# Patient Record
Sex: FEMALE | Race: WHITE | HISPANIC OR LATINO
[De-identification: names, ages, dates, MRNs, and addresses within clinical notes are randomized per-mention and may not be internally consistent; named-entity substitution may affect disease eponyms.]

---

## 2018-10-26 ENCOUNTER — RECORD ABSTRACTING (OUTPATIENT)
Age: 18
End: 2018-10-26

## 2018-10-26 DIAGNOSIS — Z82.49 FAMILY HISTORY OF ISCHEMIC HEART DISEASE AND OTHER DISEASES OF THE CIRCULATORY SYSTEM: ICD-10-CM

## 2018-10-26 DIAGNOSIS — Z83.3 FAMILY HISTORY OF DIABETES MELLITUS: ICD-10-CM

## 2018-12-20 ENCOUNTER — APPOINTMENT (OUTPATIENT)
Dept: ENDOCRINOLOGY | Facility: CLINIC | Age: 18
End: 2018-12-20

## 2019-04-12 ENCOUNTER — RX CHANGE (OUTPATIENT)
Age: 19
End: 2019-04-12

## 2020-06-17 ENCOUNTER — APPOINTMENT (OUTPATIENT)
Dept: GASTROENTEROLOGY | Facility: CLINIC | Age: 20
End: 2020-06-17
Payer: COMMERCIAL

## 2020-06-17 VITALS
DIASTOLIC BLOOD PRESSURE: 82 MMHG | HEART RATE: 110 BPM | WEIGHT: 293 LBS | BODY MASS INDEX: 43.4 KG/M2 | SYSTOLIC BLOOD PRESSURE: 122 MMHG | HEIGHT: 69 IN

## 2020-06-17 DIAGNOSIS — Z01.818 ENCOUNTER FOR OTHER PREPROCEDURAL EXAMINATION: ICD-10-CM

## 2020-06-17 PROCEDURE — 99203 OFFICE O/P NEW LOW 30 MIN: CPT

## 2020-06-17 RX ORDER — DEXAMETHASONE 1.5 MG/1
1.5 TABLET ORAL
Refills: 0 | Status: DISCONTINUED | COMMUNITY
End: 2020-06-17

## 2020-07-02 NOTE — ASSESSMENT
[FreeTextEntry1] : Patient planning for gastric sleeve, needs pre op EGD to r/o h. pylori, Bernal's esophagus, tumor, esophagitis prior to surgery. \par \par Risks (including but not limited to sedation risks, infection, bleeding, perforation), benefits and alternatives to procedure, including not doing the procedure, were discussed with patient. Patient understood and agreed to proceed with EGD\par EGD preparation instructions reviewed with patient.\par \par Patient to hold Metformin on the day of the procedure. \par

## 2020-07-02 NOTE — HISTORY OF PRESENT ILLNESS
[FreeTextEntry1] : 19 year F s/p adenoidectomy, hypothyroidism, morbid obesity, planning for bariatric surgery/gastric sleeve later this summer is seen at the request of Dr. Huerta for evaluation for EGD  to r/o h. pylori, Bernal's esophagus, tumor, esophagitis prior to surgery. \par \par she is scheduled to start metformin\par \par Patient denies abdominal pain , n/v/heartburn, reflux, dysphagia/odynophagia, change in bowel habits, diarrhea, constipation, brbpr, melena. no weight loss.  Good appetite and energy level. Patient has daily BM, denies regular NSAID use. \par \par

## 2020-07-02 NOTE — PHYSICAL EXAM
[General Appearance - Alert] : alert [General Appearance - In No Acute Distress] : in no acute distress [Sclera] : the sclera and conjunctiva were normal [Outer Ear] : the ears and nose were normal in appearance [Neck Appearance] : the appearance of the neck was normal [Apical Impulse] : the apical impulse was normal [] : no respiratory distress [Abdomen Soft] : soft [Abdomen Tenderness] : non-tender [Abnormal Walk] : normal gait [Skin Color & Pigmentation] : normal skin color and pigmentation [Cranial Nerves] : cranial nerves 2-12 were intact [Oriented To Time, Place, And Person] : oriented to person, place, and time [FreeTextEntry1] : obese

## 2020-07-05 ENCOUNTER — RESULT REVIEW (OUTPATIENT)
Age: 20
End: 2020-07-05

## 2020-07-06 ENCOUNTER — RESULT REVIEW (OUTPATIENT)
Age: 20
End: 2020-07-06

## 2020-07-07 ENCOUNTER — RESULT REVIEW (OUTPATIENT)
Age: 20
End: 2020-07-07

## 2020-07-07 ENCOUNTER — APPOINTMENT (OUTPATIENT)
Dept: GASTROENTEROLOGY | Facility: HOSPITAL | Age: 20
End: 2020-07-07

## 2020-12-23 PROBLEM — Z01.818 ENCOUNTER FOR PRE-OPERATIVE EXAMINATION: Status: RESOLVED | Noted: 2020-07-02 | Resolved: 2020-12-23

## 2020-12-29 ENCOUNTER — APPOINTMENT (OUTPATIENT)
Dept: ENDOCRINOLOGY | Facility: CLINIC | Age: 20
End: 2020-12-29
Payer: COMMERCIAL

## 2020-12-29 VITALS
SYSTOLIC BLOOD PRESSURE: 120 MMHG | OXYGEN SATURATION: 98 % | HEART RATE: 105 BPM | DIASTOLIC BLOOD PRESSURE: 80 MMHG | HEIGHT: 69 IN | WEIGHT: 293 LBS | TEMPERATURE: 98.1 F | BODY MASS INDEX: 43.4 KG/M2

## 2020-12-29 PROCEDURE — 99072 ADDL SUPL MATRL&STAF TM PHE: CPT

## 2020-12-29 PROCEDURE — G0447 BEHAVIOR COUNSEL OBESITY 15M: CPT

## 2020-12-29 PROCEDURE — 99215 OFFICE O/P EST HI 40 MIN: CPT | Mod: 25

## 2020-12-29 RX ORDER — LIRAGLUTIDE 6 MG/ML
18 INJECTION, SOLUTION SUBCUTANEOUS
Refills: 0 | Status: DISCONTINUED | COMMUNITY
End: 2020-12-29

## 2020-12-29 NOTE — HISTORY OF PRESENT ILLNESS
[FreeTextEntry1] : Ms. MERLIN EDWARDS is a 20 year old female\par coming for followup morbid obesity mild sleep apnea thyroid nodules hypothyroidism last seen in early 2018\par Gastric sleeve 9/1/2020 Dr Fraga [facility] \par lost 93lb \par feels grate \par losing her hair\par \par dose of Synthroid increased from 50 to 75mcg qd in July 2020 Dr Cota [facility]\par also was advised to start Metformin was afraid of \par \par had repeated sleep study received CPAP never used, 8/2020 before her weight loss \par \par  \par        labs reviewed with the pt done by the bariatric team thyroid was not checked\par     folows  with a nutritionist every 3 months\par   \par        reports facial hirsutism, mustache mild, lower belly,chest, lower back \par        mild strech marks\par        324 last physical 2/2016 , then 338lb last year, 360lb a year later \par  \par  \par  \par        started Synthroid 25mcg qd started 1/28/17\par        labs reviewed mildy elevated prolactin , repeated normal \par        thyroid US showed small cysts 7mm\par        denies FHx thyroid ca\par        denies neck irradiation\par        denies dysphagia\par        denies dyspnea\par        denies dysphonia.

## 2020-12-29 NOTE — REVIEW OF SYSTEMS
[Recent Weight Loss (___ Lbs)] : recent weight loss: [unfilled] lbs [Acanthosis] : acanthosis [Cold Intolerance] : cold intolerance [Negative] : Heme/Lymph [Irregular Menses] : regular menses [FreeTextEntry8] : no more heavy periods

## 2020-12-29 NOTE — REASON FOR VISIT
[Follow - Up] : a follow-up visit [Other___] : [unfilled] [Hypothyroidism] : hypothyroidism [Thyroid nodule/ MNG] : thyroid nodule/ MNG [Weight Management/Obesity] : weight management/obesity

## 2021-01-04 ENCOUNTER — RESULT REVIEW (OUTPATIENT)
Age: 21
End: 2021-01-04

## 2021-01-15 LAB
ACTH SER-ACNC: 10.4 PG/ML
ALBUMIN SERPL ELPH-MCNC: 4.3 G/DL
ALP BLD-CCNC: 99 U/L
ALPHA SUBUNIT SERPL-MCNC: <0.15 NG/ML
ALT SERPL-CCNC: 18 U/L
ANION GAP SERPL CALC-SCNC: 19 MMOL/L
AST SERPL-CCNC: 13 U/L
BASOPHILS # BLD AUTO: 0.02 K/UL
BASOPHILS NFR BLD AUTO: 0.3 %
BILIRUB SERPL-MCNC: 0.7 MG/DL
BUN SERPL-MCNC: 10 MG/DL
CALCIUM SERPL-MCNC: 9.7 MG/DL
CHLORIDE SERPL-SCNC: 106 MMOL/L
CHOLEST SERPL-MCNC: 178 MG/DL
CO2 SERPL-SCNC: 17 MMOL/L
CORTIS SERPL-MCNC: 12.2 UG/DL
CREAT SERPL-MCNC: 0.93 MG/DL
EOSINOPHIL # BLD AUTO: 0.03 K/UL
EOSINOPHIL NFR BLD AUTO: 0.5 %
ESTIMATED AVERAGE GLUCOSE: 97 MG/DL
ESTRADIOL SERPL-MCNC: 86 PG/ML
FSH SERPL-MCNC: 2.3 IU/L
GH SERPL-MCNC: 3.37 NG/ML
GLUCOSE SERPL-MCNC: 72 MG/DL
HBA1C MFR BLD HPLC: 5 %
HCT VFR BLD CALC: 44.6 %
HDLC SERPL-MCNC: 34 MG/DL
HGB BLD-MCNC: 14.2 G/DL
IGF-1 INTERP: NORMAL
IGF-I BLD-MCNC: 242 NG/ML
IMM GRANULOCYTES NFR BLD AUTO: 0.2 %
LDLC SERPL CALC-MCNC: 118 MG/DL
LH SERPL-ACNC: 3.7 IU/L
LYMPHOCYTES # BLD AUTO: 1.14 K/UL
LYMPHOCYTES NFR BLD AUTO: 18.8 %
MAN DIFF?: NORMAL
MCHC RBC-ENTMCNC: 28.7 PG
MCHC RBC-ENTMCNC: 31.8 GM/DL
MCV RBC AUTO: 90.1 FL
MONOCYTES # BLD AUTO: 0.64 K/UL
MONOCYTES NFR BLD AUTO: 10.6 %
NEUTROPHILS # BLD AUTO: 4.21 K/UL
NEUTROPHILS NFR BLD AUTO: 69.6 %
NONHDLC SERPL-MCNC: 144 MG/DL
OSMOLALITY SERPL: 294 MOSMOL/KG
OSMOLALITY UR: 719 MOSM/KG
PLATELET # BLD AUTO: 240 K/UL
POTASSIUM SERPL-SCNC: 4.1 MMOL/L
PROLACTIN SERPL-MCNC: 24.3 NG/ML
PROT SERPL-MCNC: 6.8 G/DL
RBC # BLD: 4.95 M/UL
RBC # FLD: 13.4 %
SODIUM SERPL-SCNC: 142 MMOL/L
T4 FREE SERPL-MCNC: 1.3 NG/DL
TRIGL SERPL-MCNC: 126 MG/DL
TSH SERPL-ACNC: 2.28 UIU/ML
WBC # FLD AUTO: 6.05 K/UL

## 2021-04-01 ENCOUNTER — APPOINTMENT (OUTPATIENT)
Dept: ENDOCRINOLOGY | Facility: CLINIC | Age: 21
End: 2021-04-01
Payer: COMMERCIAL

## 2021-04-01 VITALS
HEIGHT: 69 IN | DIASTOLIC BLOOD PRESSURE: 64 MMHG | OXYGEN SATURATION: 98 % | SYSTOLIC BLOOD PRESSURE: 112 MMHG | BODY MASS INDEX: 39.4 KG/M2 | WEIGHT: 266 LBS | HEART RATE: 57 BPM

## 2021-04-01 LAB
ALBUMIN SERPL ELPH-MCNC: 4.3 G/DL
ALP BLD-CCNC: 95 U/L
ALT SERPL-CCNC: 14 U/L
ANION GAP SERPL CALC-SCNC: 12 MMOL/L
AST SERPL-CCNC: 11 U/L
BILIRUB SERPL-MCNC: 0.6 MG/DL
BUN SERPL-MCNC: 11 MG/DL
CALCIUM SERPL-MCNC: 9.7 MG/DL
CHLORIDE SERPL-SCNC: 106 MMOL/L
CHOLEST SERPL-MCNC: 168 MG/DL
CO2 SERPL-SCNC: 24 MMOL/L
CREAT SERPL-MCNC: 0.79 MG/DL
ESTIMATED AVERAGE GLUCOSE: 103 MG/DL
GLUCOSE SERPL-MCNC: 95 MG/DL
HBA1C MFR BLD HPLC: 5.2 %
HDLC SERPL-MCNC: 36 MG/DL
LDLC SERPL CALC-MCNC: 113 MG/DL
MONOMERIC PROLACTIN (ICMA)*: 11.5 NG/ML
NONHDLC SERPL-MCNC: 132 MG/DL
PERCENT MACROPROLACTIN: 27 %
POTASSIUM SERPL-SCNC: 4.1 MMOL/L
PROLACTIN SERPL-MCNC: 16.6 NG/ML
PROLACTIN SERPL-MCNC: 16.7 NG/ML
PROLACTIN SERPL-MCNC: 16.7 NG/ML
PROLACTIN, SERUM (ICMA)*: 15.7 NG/ML
PROT SERPL-MCNC: 6.6 G/DL
SODIUM SERPL-SCNC: 142 MMOL/L
T4 FREE SERPL-MCNC: 1.2 NG/DL
TRIGL SERPL-MCNC: 97 MG/DL
TSH SERPL-ACNC: 1.54 UIU/ML

## 2021-04-01 PROCEDURE — 99072 ADDL SUPL MATRL&STAF TM PHE: CPT

## 2021-04-01 PROCEDURE — G0447 BEHAVIOR COUNSEL OBESITY 15M: CPT

## 2021-04-01 PROCEDURE — 99214 OFFICE O/P EST MOD 30 MIN: CPT | Mod: 25

## 2021-04-01 RX ORDER — URSODIOL 300 MG/1
300 CAPSULE ORAL
Qty: 60 | Refills: 0 | Status: DISCONTINUED | COMMUNITY
Start: 2020-08-31 | End: 2021-04-01

## 2021-04-01 RX ORDER — FAMOTIDINE 20 MG/1
20 TABLET, FILM COATED ORAL
Qty: 180 | Refills: 0 | Status: DISCONTINUED | COMMUNITY
Start: 2020-08-31 | End: 2021-04-01

## 2021-04-01 RX ORDER — ENOXAPARIN SODIUM 100 MG/ML
60 INJECTION SUBCUTANEOUS
Qty: 17 | Refills: 0 | Status: DISCONTINUED | COMMUNITY
Start: 2020-09-02 | End: 2021-04-01

## 2021-04-01 RX ORDER — CELECOXIB 200 MG/1
200 CAPSULE ORAL
Qty: 6 | Refills: 0 | Status: DISCONTINUED | COMMUNITY
Start: 2020-08-31 | End: 2021-04-01

## 2021-04-01 RX ORDER — ONDANSETRON 4 MG/1
4 TABLET, ORALLY DISINTEGRATING ORAL
Qty: 21 | Refills: 0 | Status: DISCONTINUED | COMMUNITY
Start: 2020-09-15 | End: 2021-04-01

## 2021-04-01 NOTE — HISTORY OF PRESENT ILLNESS
[FreeTextEntry1] : Ms. MERLIN EDWARDS is a 20 year old female\par coming for followup morbid obesity mild sleep apnea thyroid nodules hypothyroidism last seen in early 2018\par Gastric sleeve 9/1/2020 Dr Fraga [facility] \par lost 130lb since then feels good no active complains \par worried about taking calciumwas it was elevated in the past \par \par \par dose of Synthroid increased from 50 to 75mcg qd in July 2020 Dr Cota [facility]\par also was advised to start Metformin was afraid of \par \par had repeated sleep study received CPAP never used, 8/2020 before her weight loss \par \par  \par        labs reviewed with the pt done by the bariatric team thyroid was not checked\par     folows  with a nutritionist every 3 months\par   \par        reports facial hirsutism, mustache mild, lower belly,chest, lower back \par        mild stretch marks\par        324 last physical 2/2016 , then 338lb last year, 360lb a year later \par  \par  \par  \par        started Synthroid 25mcg qd started 1/28/17\par        labs reviewed mildy elevated prolactin , repeated normal \par        thyroid US showed small cysts 7mm\par        denies FHx thyroid ca\par        denies neck irradiation\par        denies dysphagia\par        denies dyspnea\par        denies dysphonia.

## 2021-04-08 ENCOUNTER — APPOINTMENT (OUTPATIENT)
Dept: INTERNAL MEDICINE | Facility: CLINIC | Age: 21
End: 2021-04-08
Payer: COMMERCIAL

## 2021-04-08 VITALS
HEART RATE: 74 BPM | WEIGHT: 259 LBS | TEMPERATURE: 97.4 F | DIASTOLIC BLOOD PRESSURE: 80 MMHG | SYSTOLIC BLOOD PRESSURE: 122 MMHG | BODY MASS INDEX: 38.36 KG/M2 | OXYGEN SATURATION: 97 % | HEIGHT: 69 IN

## 2021-04-08 DIAGNOSIS — Z87.898 PERSONAL HISTORY OF OTHER SPECIFIED CONDITIONS: ICD-10-CM

## 2021-04-08 DIAGNOSIS — Z78.9 OTHER SPECIFIED HEALTH STATUS: ICD-10-CM

## 2021-04-08 DIAGNOSIS — Z86.39 PERSONAL HISTORY OF OTHER ENDOCRINE, NUTRITIONAL AND METABOLIC DISEASE: ICD-10-CM

## 2021-04-08 PROCEDURE — 99072 ADDL SUPL MATRL&STAF TM PHE: CPT

## 2021-04-08 PROCEDURE — 99203 OFFICE O/P NEW LOW 30 MIN: CPT | Mod: 25

## 2021-04-08 PROCEDURE — 36415 COLL VENOUS BLD VENIPUNCTURE: CPT

## 2021-04-08 NOTE — HISTORY OF PRESENT ILLNESS
[FreeTextEntry8] : here  to establish care, former px in pediatric office  , needs form completion for school\par had bariatric sx last Sept , recovered well , preop weight 400 Lb

## 2021-04-08 NOTE — REVIEW OF SYSTEMS
[Negative] : Neurological [Recent Change In Weight] : ~T recent weight change [FreeTextEntry2] : lost a lot

## 2021-04-12 LAB
M TB IFN-G BLD-IMP: NEGATIVE
QUANTIFERON TB PLUS MITOGEN MINUS NIL: 7.45 IU/ML
QUANTIFERON TB PLUS NIL: 0.01 IU/ML
QUANTIFERON TB PLUS TB1 MINUS NIL: 0 IU/ML
QUANTIFERON TB PLUS TB2 MINUS NIL: 0 IU/ML

## 2021-04-27 ENCOUNTER — LABORATORY RESULT (OUTPATIENT)
Age: 21
End: 2021-04-27

## 2021-04-27 ENCOUNTER — APPOINTMENT (OUTPATIENT)
Dept: INTERNAL MEDICINE | Facility: CLINIC | Age: 21
End: 2021-04-27
Payer: COMMERCIAL

## 2021-04-27 VITALS
BODY MASS INDEX: 37.77 KG/M2 | OXYGEN SATURATION: 98 % | DIASTOLIC BLOOD PRESSURE: 70 MMHG | TEMPERATURE: 97 F | HEART RATE: 58 BPM | SYSTOLIC BLOOD PRESSURE: 136 MMHG | WEIGHT: 255 LBS | HEIGHT: 69 IN

## 2021-04-27 DIAGNOSIS — Z11.1 ENCOUNTER FOR SCREENING FOR RESPIRATORY TUBERCULOSIS: ICD-10-CM

## 2021-04-27 DIAGNOSIS — Z71.89 OTHER SPECIFIED COUNSELING: ICD-10-CM

## 2021-04-27 DIAGNOSIS — Z02.89 ENCOUNTER FOR OTHER ADMINISTRATIVE EXAMINATIONS: ICD-10-CM

## 2021-04-27 DIAGNOSIS — Z76.89 PERSONS ENCOUNTERING HEALTH SERVICES IN OTHER SPECIFIED CIRCUMSTANCES: ICD-10-CM

## 2021-04-27 DIAGNOSIS — Z00.01 ENCOUNTER FOR GENERAL ADULT MEDICAL EXAMINATION WITH ABNORMAL FINDINGS: ICD-10-CM

## 2021-04-27 DIAGNOSIS — Z00.00 ENCOUNTER FOR GENERAL ADULT MEDICAL EXAMINATION W/OUT ABNORMAL FINDINGS: ICD-10-CM

## 2021-04-27 DIAGNOSIS — Z23 ENCOUNTER FOR IMMUNIZATION: ICD-10-CM

## 2021-04-27 PROCEDURE — 90715 TDAP VACCINE 7 YRS/> IM: CPT

## 2021-04-27 PROCEDURE — 99395 PREV VISIT EST AGE 18-39: CPT | Mod: 25

## 2021-04-27 PROCEDURE — 90471 IMMUNIZATION ADMIN: CPT

## 2021-04-27 PROCEDURE — 99072 ADDL SUPL MATRL&STAF TM PHE: CPT

## 2021-04-27 PROCEDURE — 36415 COLL VENOUS BLD VENIPUNCTURE: CPT

## 2021-04-27 NOTE — PHYSICAL EXAM
[Obese] : patient was observed to be obese [Normal] : normal gait, coordination grossly intact, no focal deficits [de-identified] : ? L nodule on thyroid [de-identified] : bilat cystic no  masses [de-identified] : no rashes, excess skin

## 2021-04-27 NOTE — REVIEW OF SYSTEMS
[Recent Change In Weight] : ~T recent weight change [Negative] : Neurological [FreeTextEntry2] : lost  146 lb after surgery

## 2021-04-28 LAB
25(OH)D3 SERPL-MCNC: 39.6 NG/ML
ALBUMIN SERPL ELPH-MCNC: 4.2 G/DL
ALP BLD-CCNC: 103 U/L
ALT SERPL-CCNC: 14 U/L
ANION GAP SERPL CALC-SCNC: 15 MMOL/L
APPEARANCE: CLEAR
AST SERPL-CCNC: 16 U/L
BASOPHILS # BLD AUTO: 0.02 K/UL
BASOPHILS NFR BLD AUTO: 0.4 %
BILIRUB SERPL-MCNC: 0.4 MG/DL
BILIRUBIN URINE: NEGATIVE
BLOOD URINE: NEGATIVE
BUN SERPL-MCNC: 11 MG/DL
CALCIUM SERPL-MCNC: 9.7 MG/DL
CHLORIDE SERPL-SCNC: 105 MMOL/L
CHOLEST SERPL-MCNC: 181 MG/DL
CO2 SERPL-SCNC: 22 MMOL/L
COLOR: YELLOW
CREAT SERPL-MCNC: 0.7 MG/DL
EOSINOPHIL # BLD AUTO: 0.03 K/UL
EOSINOPHIL NFR BLD AUTO: 0.6 %
GLUCOSE QUALITATIVE U: NEGATIVE
GLUCOSE SERPL-MCNC: 89 MG/DL
HBV SURFACE AB SER QL: NONREACTIVE
HBV SURFACE AG SER QL: NONREACTIVE
HCT VFR BLD CALC: 41.4 %
HCV AB SER QL: NONREACTIVE
HCV S/CO RATIO: 0.16 S/CO
HDLC SERPL-MCNC: 44 MG/DL
HGB BLD-MCNC: 12.9 G/DL
HIV1+2 AB SPEC QL IA.RAPID: NONREACTIVE
IMM GRANULOCYTES NFR BLD AUTO: 0.2 %
KETONES URINE: NEGATIVE
LDLC SERPL CALC-MCNC: 119 MG/DL
LEUKOCYTE ESTERASE URINE: NEGATIVE
LYMPHOCYTES # BLD AUTO: 1.31 K/UL
LYMPHOCYTES NFR BLD AUTO: 24.1 %
MAN DIFF?: NORMAL
MCHC RBC-ENTMCNC: 27.6 PG
MCHC RBC-ENTMCNC: 31.2 GM/DL
MCV RBC AUTO: 88.5 FL
MEV IGG FLD QL IA: 61.7 AU/ML
MEV IGG+IGM SER-IMP: POSITIVE
MONOCYTES # BLD AUTO: 0.38 K/UL
MONOCYTES NFR BLD AUTO: 7 %
MUV AB SER-ACNC: POSITIVE
MUV IGG SER QL IA: 72.2 AU/ML
NEUTROPHILS # BLD AUTO: 3.68 K/UL
NEUTROPHILS NFR BLD AUTO: 67.7 %
NITRITE URINE: NEGATIVE
NONHDLC SERPL-MCNC: 137 MG/DL
PH URINE: 6
PLATELET # BLD AUTO: 226 K/UL
POTASSIUM SERPL-SCNC: 4.2 MMOL/L
PROT SERPL-MCNC: 6.8 G/DL
PROTEIN URINE: NORMAL
RBC # BLD: 4.68 M/UL
RBC # FLD: 13.2 %
RUBV IGG FLD-ACNC: 3 INDEX
RUBV IGG SER-IMP: POSITIVE
SODIUM SERPL-SCNC: 141 MMOL/L
SPECIFIC GRAVITY URINE: 1.03
T4 FREE SERPL-MCNC: 1.5 NG/DL
TRIGL SERPL-MCNC: 92 MG/DL
TSH SERPL-ACNC: 1.3 UIU/ML
UROBILINOGEN URINE: ABNORMAL
VZV AB TITR SER: POSITIVE
VZV IGG SER IF-ACNC: 1840 INDEX
WBC # FLD AUTO: 5.43 K/UL

## 2021-05-01 NOTE — HEALTH RISK ASSESSMENT
[Good] : ~his/her~  mood as  good [No] : No [HIV Test offered] : HIV Test offered [College] : College [Single] : single [Patient/Caregiver not ready to engage] : Patient/Caregiver not ready to engage [] : No DC instructions

## 2021-05-06 ENCOUNTER — APPOINTMENT (OUTPATIENT)
Dept: INTERNAL MEDICINE | Facility: CLINIC | Age: 21
End: 2021-05-06
Payer: COMMERCIAL

## 2021-05-06 VITALS — TEMPERATURE: 97.6 F

## 2021-05-06 DIAGNOSIS — Z23 ENCOUNTER FOR IMMUNIZATION: ICD-10-CM

## 2021-05-06 PROCEDURE — 90746 HEPB VACCINE 3 DOSE ADULT IM: CPT

## 2021-05-06 PROCEDURE — 90471 IMMUNIZATION ADMIN: CPT

## 2021-05-06 PROCEDURE — 99072 ADDL SUPL MATRL&STAF TM PHE: CPT

## 2021-06-02 ENCOUNTER — APPOINTMENT (OUTPATIENT)
Dept: INTERNAL MEDICINE | Facility: CLINIC | Age: 21
End: 2021-06-02
Payer: COMMERCIAL

## 2021-06-02 DIAGNOSIS — Z01.84 ENCOUNTER FOR ANTIBODY RESPONSE EXAMINATION: ICD-10-CM

## 2021-06-02 PROCEDURE — 99072 ADDL SUPL MATRL&STAF TM PHE: CPT

## 2021-06-02 PROCEDURE — 36415 COLL VENOUS BLD VENIPUNCTURE: CPT

## 2021-06-03 LAB — HBV SURFACE AB SERPL IA-ACNC: 17.2 MIU/ML

## 2021-06-17 ENCOUNTER — APPOINTMENT (OUTPATIENT)
Dept: INTERNAL MEDICINE | Facility: CLINIC | Age: 21
End: 2021-06-17

## 2021-08-10 ENCOUNTER — APPOINTMENT (OUTPATIENT)
Dept: ENDOCRINOLOGY | Facility: CLINIC | Age: 21
End: 2021-08-10
Payer: COMMERCIAL

## 2021-08-10 VITALS
OXYGEN SATURATION: 98 % | WEIGHT: 246 LBS | SYSTOLIC BLOOD PRESSURE: 132 MMHG | BODY MASS INDEX: 36.43 KG/M2 | HEART RATE: 47 BPM | RESPIRATION RATE: 18 BRPM | DIASTOLIC BLOOD PRESSURE: 74 MMHG | TEMPERATURE: 96.3 F | HEIGHT: 69 IN

## 2021-08-10 DIAGNOSIS — E78.1 PURE HYPERGLYCERIDEMIA: ICD-10-CM

## 2021-08-10 LAB
17OHP SERPL-MCNC: 11 NG/DL
ALBUMIN SERPL ELPH-MCNC: 4.2 G/DL
ALP BLD-CCNC: 71 U/L
ALT SERPL-CCNC: 11 U/L
ANDROST SERPL-MCNC: 64 NG/DL
ANION GAP SERPL CALC-SCNC: 11 MMOL/L
AST SERPL-CCNC: 13 U/L
BILIRUB SERPL-MCNC: 0.5 MG/DL
BUN SERPL-MCNC: 13 MG/DL
CALCIUM SERPL-MCNC: 9.8 MG/DL
CHLORIDE SERPL-SCNC: 106 MMOL/L
CHOLEST SERPL-MCNC: 176 MG/DL
CO2 SERPL-SCNC: 23 MMOL/L
CREAT SERPL-MCNC: 0.96 MG/DL
DHEA-S SERPL-MCNC: 193 UG/DL
ESTIMATED AVERAGE GLUCOSE: 103 MG/DL
GLUCOSE SERPL-MCNC: 89 MG/DL
HBA1C MFR BLD HPLC: 5.2 %
HDLC SERPL-MCNC: 47 MG/DL
LDLC SERPL CALC-MCNC: 113 MG/DL
NONHDLC SERPL-MCNC: 129 MG/DL
POTASSIUM SERPL-SCNC: 4.2 MMOL/L
PROLACTIN SERPL-MCNC: 19.6 NG/ML
PROT SERPL-MCNC: 6.7 G/DL
SODIUM SERPL-SCNC: 140 MMOL/L
T4 FREE SERPL-MCNC: 1.6 NG/DL
TESTOST SERPL-MCNC: 21.5 NG/DL
THYROGLOB AB SERPL-ACNC: <20 IU/ML
THYROPEROXIDASE AB SERPL IA-ACNC: <10 IU/ML
TRIGL SERPL-MCNC: 81 MG/DL
TSH SERPL-ACNC: 1.64 UIU/ML

## 2021-08-10 PROCEDURE — 99215 OFFICE O/P EST HI 40 MIN: CPT | Mod: 25

## 2021-08-10 PROCEDURE — G0447 BEHAVIOR COUNSEL OBESITY 15M: CPT

## 2021-08-10 NOTE — HISTORY OF PRESENT ILLNESS
[FreeTextEntry1] : Ms. MERLIN EDWARDS is a 20 year old female\par coming for followup morbid obesity mild sleep apnea thyroid nodules hypothyroidism last seen in early 2018\par Gastric sleeve 9/1/2020 Dr Fraga [facility] \par lost 130lb since then feels good no active complains \par worried about taking calciumwas it was elevated in the past \par \par \par dose of Synthroid increased from 50 to 75mcg qd in July 2020 Dr Cota [facility]\par also was advised to start Metformin was afraid of \par \par had repeated sleep study received CPAP never used, 8/2020 before her weight loss \par \par  \par        labs reviewed with the pt done by the bariatric team thyroid was not checked\par     folows  with a nutritionist every 3 months\par   \par        reports facial hirsutism, mustache mild, lower belly,chest, lower back \par        mild stretch marks\par        324 last physical 2/2016 , then 338lb last year, 360lb a year later \par  \par  \par  \par        started Synthroid 25mcg qd started 1/28/17\par        labs reviewed mildy elevated prolactin , repeated normal \par        thyroid US showed small cysts 7mm\par        denies FHx thyroid ca\par        denies neck irradiation\par        denies dysphagia\par        denies dyspnea\par        denies dysphonia.

## 2021-12-18 ENCOUNTER — LABORATORY RESULT (OUTPATIENT)
Age: 21
End: 2021-12-18

## 2021-12-23 ENCOUNTER — RESULT REVIEW (OUTPATIENT)
Age: 21
End: 2021-12-23

## 2021-12-27 ENCOUNTER — APPOINTMENT (OUTPATIENT)
Dept: ENDOCRINOLOGY | Facility: CLINIC | Age: 21
End: 2021-12-27
Payer: COMMERCIAL

## 2021-12-27 VITALS
OXYGEN SATURATION: 99 % | HEIGHT: 69 IN | HEART RATE: 55 BPM | BODY MASS INDEX: 34.66 KG/M2 | WEIGHT: 234 LBS | DIASTOLIC BLOOD PRESSURE: 80 MMHG | SYSTOLIC BLOOD PRESSURE: 120 MMHG

## 2021-12-27 DIAGNOSIS — L83 ACANTHOSIS NIGRICANS: ICD-10-CM

## 2021-12-27 DIAGNOSIS — L65.9 NONSCARRING HAIR LOSS, UNSPECIFIED: ICD-10-CM

## 2021-12-27 PROCEDURE — G0447 BEHAVIOR COUNSEL OBESITY 15M: CPT

## 2021-12-27 PROCEDURE — 99215 OFFICE O/P EST HI 40 MIN: CPT

## 2021-12-27 NOTE — HISTORY OF PRESENT ILLNESS
[FreeTextEntry1] : Ms. MERLIN EDWARDS is a 21 year old female\par coming for followup morbid obesity mild sleep apnea thyroid nodules hypothyroidism last seen in early 2018\par Gastric sleeve 9/1/2020 Dr Fraga [facility] \par lost 130lb since then feels good no active complains \par worried about taking calciumwas it was elevated in the past \par \par \par dose of Synthroid increased from 50 to 75mcg qd in July 2020 Dr Cota [facility]\par also was advised to start Metformin was afraid of \par \par had repeated sleep study received CPAP never used, 8/2020 before her weight loss \par \par  \par        labs reviewed with the pt done by the bariatric team thyroid was not checked\par     folows  with a nutritionist every 3 months\par   \par        reports facial hirsutism, mustache mild, lower belly,chest, lower back \par        mild stretch marks\par        324 last physical 2/2016 , then 338lb last year, 360lb a year later \par  \par  \par  \par        started Synthroid 25mcg qd started 1/28/17\par        labs reviewed mildy elevated prolactin , repeated normal \par        thyroid US showed small cysts 7mm\par        denies FHx thyroid ca\par        denies neck irradiation\par        denies dysphagia\par        denies dyspnea\par        denies dysphonia.

## 2022-05-20 ENCOUNTER — APPOINTMENT (OUTPATIENT)
Dept: ENDOCRINOLOGY | Facility: CLINIC | Age: 22
End: 2022-05-20

## 2022-08-30 ENCOUNTER — APPOINTMENT (OUTPATIENT)
Dept: INTERNAL MEDICINE | Facility: CLINIC | Age: 22
End: 2022-08-30

## 2022-10-19 ENCOUNTER — APPOINTMENT (OUTPATIENT)
Dept: ENDOCRINOLOGY | Facility: CLINIC | Age: 22
End: 2022-10-19

## 2022-11-09 ENCOUNTER — APPOINTMENT (OUTPATIENT)
Dept: ENDOCRINOLOGY | Facility: CLINIC | Age: 22
End: 2022-11-09

## 2022-11-09 VITALS — BODY MASS INDEX: 38.51 KG/M2 | WEIGHT: 260 LBS | HEIGHT: 69 IN

## 2022-11-09 DIAGNOSIS — Z86.69 PERSONAL HISTORY OF OTHER DISEASES OF THE NERVOUS SYSTEM AND SENSE ORGANS: ICD-10-CM

## 2022-11-09 DIAGNOSIS — Z86.39 PERSONAL HISTORY OF OTHER ENDOCRINE, NUTRITIONAL AND METABOLIC DISEASE: ICD-10-CM

## 2022-11-09 DIAGNOSIS — R73.03 PREDIABETES.: ICD-10-CM

## 2022-11-09 LAB
ALBUMIN SERPL ELPH-MCNC: 4.1 G/DL
ALP BLD-CCNC: 59 U/L
ALT SERPL-CCNC: 11 U/L
ANION GAP SERPL CALC-SCNC: 12 MMOL/L
AST SERPL-CCNC: 13 U/L
BILIRUB SERPL-MCNC: 0.5 MG/DL
BUN SERPL-MCNC: 11 MG/DL
CALCIUM SERPL-MCNC: 9.1 MG/DL
CHLORIDE SERPL-SCNC: 105 MMOL/L
CHOLEST SERPL-MCNC: 186 MG/DL
CO2 SERPL-SCNC: 23 MMOL/L
CREAT SERPL-MCNC: 0.87 MG/DL
EGFR: 95 ML/MIN/1.73M2
ESTIMATED AVERAGE GLUCOSE: 103 MG/DL
FERRITIN SERPL-MCNC: 10 NG/ML
GLUCOSE SERPL-MCNC: 92 MG/DL
HBA1C MFR BLD HPLC: 5.2 %
HDLC SERPL-MCNC: 49 MG/DL
IRON SATN MFR SERPL: 30 %
IRON SERPL-MCNC: 111 UG/DL
LDLC SERPL CALC-MCNC: 110 MG/DL
NONHDLC SERPL-MCNC: 137 MG/DL
POTASSIUM SERPL-SCNC: 4.5 MMOL/L
PROT SERPL-MCNC: 6.9 G/DL
SODIUM SERPL-SCNC: 140 MMOL/L
T4 FREE SERPL-MCNC: 1.6 NG/DL
TIBC SERPL-MCNC: 368 UG/DL
TRIGL SERPL-MCNC: 135 MG/DL
TSH SERPL-ACNC: 1.09 UIU/ML
UIBC SERPL-MCNC: 256 UG/DL

## 2022-11-09 PROCEDURE — G0447 BEHAVIOR COUNSEL OBESITY 15M: CPT | Mod: 95

## 2022-11-09 PROCEDURE — 99215 OFFICE O/P EST HI 40 MIN: CPT | Mod: 25,95

## 2022-11-09 RX ORDER — CALCIUM CARBONATE/VITAMIN D3 500 MG-2.5
TABLET,CHEWABLE ORAL
Refills: 0 | Status: DISCONTINUED | COMMUNITY
End: 2022-11-09

## 2022-11-09 RX ORDER — UBIDECARENONE/VIT E ACET 100MG-5
CAPSULE ORAL
Refills: 0 | Status: DISCONTINUED | COMMUNITY
End: 2022-11-09

## 2022-11-09 RX ORDER — ACETAMINOPHEN/DIPHENHYDRAMINE 500MG-25MG
1000 TABLET ORAL
Refills: 0 | Status: DISCONTINUED | COMMUNITY
End: 2022-11-09

## 2022-11-09 RX ORDER — ALBUTEROL SULFATE 90 UG/1
108 (90 BASE) INHALANT RESPIRATORY (INHALATION)
Qty: 1 | Refills: 2 | Status: DISCONTINUED | COMMUNITY
Start: 2021-08-02 | End: 2022-11-09

## 2022-11-09 NOTE — HISTORY OF PRESENT ILLNESS
[Home] : at home, [unfilled] , at the time of the visit. [Medical Office: ([facility])___] : at the medical office located in  [Verbal consent obtained from patient] : the patient, [unfilled] [FreeTextEntry1] : Ms. MERLIN EDWARDS is a 22 year old female\par coming for followup morbid obesity mild sleep apnea thyroid nodules hypothyroidism last seen in early 2018\par Gastric sleeve 9/1/2020 Dr Fraga [facility] \par lost 130lb since then feels good no active complains \par worried about taking calcium was it was elevated in the past \par \par \par dose of Synthroid increased from 50 to 75mcg qd in July 2020 Dr Cota [facility]\par also was advised to start Metformin was afraid of \par \par had repeated sleep study received CPAP never used, 8/2020 before her weight loss \par \par  \par        labs reviewed with the pt done by the bariatric team thyroid was not checked\par     folows  with a nutritionist every 3 months\par   \par        reports facial hirsutism, mustache mild, lower belly,chest, lower back \par        mild stretch marks\par        324 last physical 2/2016 , then 338lb last year, 360lb a year later \par  \par  \par  \par        started Synthroid 25mcg qd started 1/28/17\par        labs reviewed mildy elevated prolactin , repeated normal \par        thyroid US showed small cysts 7mm\par        denies FHx thyroid ca\par        denies neck irradiation\par        denies dysphagia\par        denies dyspnea\par        denies dysphonia.

## 2022-12-13 ENCOUNTER — APPOINTMENT (OUTPATIENT)
Dept: HEMATOLOGY ONCOLOGY | Facility: CLINIC | Age: 22
End: 2022-12-13

## 2022-12-13 VITALS
TEMPERATURE: 97.9 F | HEART RATE: 68 BPM | RESPIRATION RATE: 16 BRPM | HEIGHT: 69 IN | DIASTOLIC BLOOD PRESSURE: 83 MMHG | WEIGHT: 271.8 LBS | SYSTOLIC BLOOD PRESSURE: 139 MMHG | OXYGEN SATURATION: 98 % | BODY MASS INDEX: 40.26 KG/M2

## 2022-12-13 PROCEDURE — 99205 OFFICE O/P NEW HI 60 MIN: CPT

## 2022-12-13 RX ORDER — IBUPROFEN 600 MG/1
600 TABLET, FILM COATED ORAL
Qty: 42 | Refills: 0 | Status: COMPLETED | COMMUNITY
Start: 2022-06-28

## 2022-12-13 RX ORDER — CLINDAMYCIN HYDROCHLORIDE 300 MG/1
300 CAPSULE ORAL
Qty: 21 | Refills: 0 | Status: COMPLETED | COMMUNITY
Start: 2022-06-28

## 2022-12-13 NOTE — HISTORY OF PRESENT ILLNESS
[de-identified] : Ms. Amber Magdaleno is 22 year old with PIETRO here for consultation, referred by Dr. Yeni Gonzalez\par \par Patient with PMhx of hypothyroidism, hypercholesterolemia who had heavy menses prior to gastric bypass in 9/2020.  Menses are now normal - 4-7 days monthly with no clots.. \par Takes b12 supplement some times. \par \par 10/22/22 H/H 13.7/42.2 MCV 88.5 Ferritin 10\par 12/18/2021 Ferritin 21\par \par FHx:\par Mother with anemia \par \par Social History\par Smoking - never\par Alcohol - none\par Illicit drugs - none\par Getting her MS in speech. \par \par She has chronic fatigue with dizziness and headaches. \par

## 2022-12-13 NOTE — CONSULT LETTER
[Dear  ___] : Dear  [unfilled], [Consult Letter:] : I had the pleasure of evaluating your patient, [unfilled]. [Please see my note below.] : Please see my note below. [Consult Closing:] : Thank you very much for allowing me to participate in the care of this patient.  If you have any questions, please do not hesitate to contact me. [Sincerely,] : Sincerely, [FreeTextEntry3] : Yann Willis MD, MPH\par  of Medicine [facility] [employer] at [facility]\par Attending Physician \par Hematology and Medical Oncology\par [facility]\par

## 2022-12-13 NOTE — ASSESSMENT
[FreeTextEntry1] : Iron deficiency\par Ferritin 10\par Gastric sleeve surgery 2020 at [facility]\par LMP - 2 weeks ago. Was heavy prior to the weight loss surgery, but has been milder since\par Usually last 5 days, heavy x 1-2 days\par Symptoms- Tired fatigued, PICA (salt)\par Discussed at length about iron deficiency- etiology, signs and symptoms, complications, management options\par DIscussed about oral vs IV Iron\par I have reviewed the risks, benefits and side effects of IV iron with the patient. All questions were answered to satisfaction. Patient agrees to pursue IV iron.\par Schedule IV injectafer 750 mg x 2 or IV feraheme 510 mg x 2 or IV venofer 400 mg  x 3 (in the order of preference)\par Repeat blood work including CBC, ferritin, iron studies in 5-6 weeks. \par Further IV iron PRN for ferritin < \par Information given in writing\par \par Normal hemoglobin despite iron def\par Follow the levels after iron repletion\par Work up if she developed erythrocytosis\par \par Family H/o cancer\par Father - colon cancer\par M GF- colon cancer\par P GF - colon ca\par Discussed about genetic testing. She wants to defer for now\par Encouraged to pursue colonoscopy at least 10 years prior to the youngest age at diagnosis in her family\par \par Patient had multiple questions which were answered to satisfaction\par \par Labs in 8 weeks. CBC, CMP, anemia panel\par OV few days later\par

## 2022-12-15 LAB
BASOPHILS # BLD AUTO: 0.01 K/UL
BASOPHILS NFR BLD AUTO: 0.2 %
EOSINOPHIL # BLD AUTO: 0.03 K/UL
EOSINOPHIL NFR BLD AUTO: 0.7 %
HCT VFR BLD CALC: 42.2 %
HGB BLD-MCNC: 13.7 G/DL
IMM GRANULOCYTES NFR BLD AUTO: 0.2 %
LYMPHOCYTES # BLD AUTO: 1.44 K/UL
LYMPHOCYTES NFR BLD AUTO: 34.7 %
MAN DIFF?: NORMAL
MCHC RBC-ENTMCNC: 28.7 PG
MCHC RBC-ENTMCNC: 32.5 GM/DL
MCV RBC AUTO: 88.5 FL
MONOCYTES # BLD AUTO: 0.3 K/UL
MONOCYTES NFR BLD AUTO: 7.2 %
NEUTROPHILS # BLD AUTO: 2.36 K/UL
NEUTROPHILS NFR BLD AUTO: 57 %
PLATELET # BLD AUTO: 236 K/UL
RBC # BLD: 4.77 M/UL
RBC # FLD: 12.5 %
WBC # FLD AUTO: 4.15 K/UL

## 2023-02-07 ENCOUNTER — APPOINTMENT (OUTPATIENT)
Dept: HEMATOLOGY ONCOLOGY | Facility: CLINIC | Age: 23
End: 2023-02-07

## 2023-02-07 ENCOUNTER — RESULT REVIEW (OUTPATIENT)
Age: 23
End: 2023-02-07

## 2023-02-07 VITALS
HEART RATE: 60 BPM | TEMPERATURE: 97.3 F | OXYGEN SATURATION: 100 % | HEIGHT: 69 IN | WEIGHT: 278 LBS | RESPIRATION RATE: 16 BRPM | DIASTOLIC BLOOD PRESSURE: 78 MMHG | BODY MASS INDEX: 41.18 KG/M2 | SYSTOLIC BLOOD PRESSURE: 136 MMHG

## 2023-02-15 ENCOUNTER — APPOINTMENT (OUTPATIENT)
Dept: HEMATOLOGY ONCOLOGY | Facility: CLINIC | Age: 23
End: 2023-02-15
Payer: COMMERCIAL

## 2023-02-15 VITALS
WEIGHT: 280.31 LBS | BODY MASS INDEX: 41.52 KG/M2 | HEIGHT: 69 IN | OXYGEN SATURATION: 97 % | SYSTOLIC BLOOD PRESSURE: 124 MMHG | TEMPERATURE: 97.1 F | DIASTOLIC BLOOD PRESSURE: 76 MMHG | RESPIRATION RATE: 17 BRPM | HEART RATE: 60 BPM

## 2023-02-15 PROCEDURE — 99214 OFFICE O/P EST MOD 30 MIN: CPT | Mod: 25

## 2023-02-15 NOTE — HISTORY OF PRESENT ILLNESS
[de-identified] : Ms. Amber Magdaleno is 22 year old with PIETRO here for consultation, referred by Dr. Yeni Gonzalez\par \par Patient with PMhx of hypothyroidism, hypercholesterolemia who had heavy menses prior to gastric bypass in 9/2020.  Menses are now normal - 4-7 days monthly with no clots.. \par Takes b12 supplement some times. \par \par 10/22/22 H/H 13.7/42.2 MCV 88.5 Ferritin 10\par 12/18/2021 Ferritin 21\par \par FHx:\par Mother with anemia \par \par Social History\par Smoking - never\par Alcohol - none\par Illicit drugs - none\par Getting her MS in speech. \par \par She has chronic fatigue with dizziness and headaches. \par  [de-identified] : Patient is seen today for follow up\par s/p IV Injectafer 750 mg x 2\par Feels much better. Less tired No PICA (salt)\par LMP 3 weeks ago. Lasted 5 days

## 2023-02-15 NOTE — ASSESSMENT
[FreeTextEntry1] : Iron deficiency\par Ferritin 10\par Gastric sleeve surgery 2020 at [facility]\par LMP - 2 weeks ago. Was heavy prior to the weight loss surgery, but has been milder since\par Usually last 5 days, heavy x 1-2 days\par Symptoms- Tired fatigued, PICA (salt)\par s/p IV Injectafer 750 mg x 2\par Feels much better. Less tired No PICA (salt)\par LMP 3 weeks ago. Lasted 5 days\par Blood work shows =improved Hgb and ferritin\par No indication for iV Iron\par \par Normal hemoglobin despite iron def\par Follow the levels after iron repletion\par Work up if she developed erythrocytosis\par \par Family H/o cancer\par Father - colon cancer\par M GF- colon cancer\par P GF - colon ca\par Discussed about genetic testing. She wants to defer for now\par Encouraged to pursue colonoscopy at least 10 years prior to the youngest age at diagnosis in her family\par \par Patient had multiple questions which were answered to satisfaction\par \par Labs in 6 months. CBC, CMP, ferritin \par OV few days later\par

## 2023-02-15 NOTE — CONSULT LETTER
[FreeTextEntry3] : Yann Willis MD, MPH\par  of Medicine [facility] [employer] at [facility]\par Attending Physician \par Hematology and Medical Oncology\par [facility]\par

## 2023-05-01 ENCOUNTER — APPOINTMENT (OUTPATIENT)
Dept: ENDOCRINOLOGY | Facility: CLINIC | Age: 23
End: 2023-05-01
Payer: COMMERCIAL

## 2023-05-01 VITALS
HEART RATE: 70 BPM | OXYGEN SATURATION: 98 % | WEIGHT: 287 LBS | DIASTOLIC BLOOD PRESSURE: 80 MMHG | SYSTOLIC BLOOD PRESSURE: 118 MMHG | HEIGHT: 69 IN | BODY MASS INDEX: 42.51 KG/M2

## 2023-05-01 DIAGNOSIS — K21.9 GASTRO-ESOPHAGEAL REFLUX DISEASE W/OUT ESOPHAGITIS: ICD-10-CM

## 2023-05-01 DIAGNOSIS — E04.1 NONTOXIC SINGLE THYROID NODULE: ICD-10-CM

## 2023-05-01 DIAGNOSIS — E78.00 PURE HYPERCHOLESTEROLEMIA, UNSPECIFIED: ICD-10-CM

## 2023-05-01 DIAGNOSIS — F41.9 ANXIETY DISORDER, UNSPECIFIED: ICD-10-CM

## 2023-05-01 DIAGNOSIS — E04.9 NONTOXIC GOITER, UNSPECIFIED: ICD-10-CM

## 2023-05-01 DIAGNOSIS — E03.9 HYPOTHYROIDISM, UNSPECIFIED: ICD-10-CM

## 2023-05-01 DIAGNOSIS — Z98.84 BARIATRIC SURGERY STATUS: ICD-10-CM

## 2023-05-01 LAB
ALBUMIN SERPL ELPH-MCNC: 4 G/DL
ALP BLD-CCNC: 56 U/L
ALT SERPL-CCNC: 16 U/L
ANION GAP SERPL CALC-SCNC: 10 MMOL/L
AST SERPL-CCNC: 13 U/L
BILIRUB SERPL-MCNC: 0.6 MG/DL
BUN SERPL-MCNC: 13 MG/DL
CALCIUM SERPL-MCNC: 9.2 MG/DL
CHLORIDE SERPL-SCNC: 106 MMOL/L
CHOLEST SERPL-MCNC: 188 MG/DL
CO2 SERPL-SCNC: 23 MMOL/L
CREAT SERPL-MCNC: 0.76 MG/DL
EGFR: 112 ML/MIN/1.73M2
ESTIMATED AVERAGE GLUCOSE: 100 MG/DL
GLUCOSE SERPL-MCNC: 91 MG/DL
HBA1C MFR BLD HPLC: 5.1 %
HDLC SERPL-MCNC: 49 MG/DL
LDLC SERPL CALC-MCNC: 114 MG/DL
NONHDLC SERPL-MCNC: 138 MG/DL
POTASSIUM SERPL-SCNC: 4.2 MMOL/L
PROT SERPL-MCNC: 6.3 G/DL
SODIUM SERPL-SCNC: 139 MMOL/L
T4 FREE SERPL-MCNC: 1.6 NG/DL
TRIGL SERPL-MCNC: 123 MG/DL
TSH SERPL-ACNC: 0.78 UIU/ML

## 2023-05-01 PROCEDURE — G0447 BEHAVIOR COUNSEL OBESITY 15M: CPT

## 2023-05-01 PROCEDURE — 99215 OFFICE O/P EST HI 40 MIN: CPT | Mod: 25

## 2023-05-01 PROCEDURE — G0444 DEPRESSION SCREEN ANNUAL: CPT | Mod: 59

## 2023-05-01 RX ORDER — TIRZEPATIDE 5 MG/.5ML
5 INJECTION, SOLUTION SUBCUTANEOUS
Qty: 1 | Refills: 0 | Status: DISCONTINUED | COMMUNITY
Start: 2022-11-09 | End: 2023-05-01

## 2023-05-01 NOTE — HISTORY OF PRESENT ILLNESS
[Home] : at home, [unfilled] , at the time of the visit. [Medical Office: ([facility])___] : at the medical office located in  [Verbal consent obtained from patient] : the patient, [unfilled] [FreeTextEntry1] : Ms. MERLIN EDWARDS is a 22 year old female\par coming for followup morbid obesity mild sleep apnea thyroid nodules hypothyroidism last seen in early 2018\par Gastric sleeve 9/1/2020 Dr Fraga [facility] \par lost 130lb since then feels good no active complains \par worried about taking calcium was it was elevated in the past \par \par no snoring per her parents not using CPAP had it to get approved for weight loss surgery kept off over 100lb \par dose of Synthroid increased from 50 to 75mcg qd in July 2020 Dr Cota [facility]\par also was advised to start Metformin was afraid of \par \par had repeated sleep study received CPAP never used, 8/2020 before her weight loss \par \par  \par        labs reviewed with the pt done by the bariatric team thyroid was not checked\par     folows  with a nutritionist every 3 months\par   \par        reports facial hirsutism, mustache mild, lower belly,chest, lower back \par        mild stretch marks\par        324 last physical 2/2016 , then 338lb last year, 360lb a year later \par  \par  \par  \par        started Synthroid 25mcg qd started 1/28/17\par        labs reviewed mildy elevated prolactin , repeated normal \par        thyroid US showed small cysts 7mm\par        denies FHx thyroid ca\par        denies neck irradiation\par        denies dysphagia\par        denies dyspnea\par        denies dysphonia.

## 2023-05-01 NOTE — ASSESSMENT
[0] : 2) Feeling down, depressed, or hopeless: Not at all (0) [PHQ-2 Negative - No further assessment needed] : PHQ-2 Negative - No further assessment needed [[id number]] : 0

## 2023-05-02 DIAGNOSIS — E66.9 OBESITY, UNSPECIFIED: ICD-10-CM

## 2023-06-14 ENCOUNTER — RX RENEWAL (OUTPATIENT)
Age: 23
End: 2023-06-14

## 2023-08-16 ENCOUNTER — APPOINTMENT (OUTPATIENT)
Dept: HEMATOLOGY ONCOLOGY | Facility: CLINIC | Age: 23
End: 2023-08-16

## 2023-08-16 ENCOUNTER — RESULT REVIEW (OUTPATIENT)
Age: 23
End: 2023-08-16

## 2023-08-16 VITALS
RESPIRATION RATE: 16 BRPM | SYSTOLIC BLOOD PRESSURE: 136 MMHG | DIASTOLIC BLOOD PRESSURE: 83 MMHG | TEMPERATURE: 97.3 F | WEIGHT: 283.19 LBS | OXYGEN SATURATION: 95 % | HEART RATE: 57 BPM | BODY MASS INDEX: 44.45 KG/M2 | HEIGHT: 67 IN

## 2023-08-30 ENCOUNTER — APPOINTMENT (OUTPATIENT)
Dept: HEMATOLOGY ONCOLOGY | Facility: CLINIC | Age: 23
End: 2023-08-30
Payer: COMMERCIAL

## 2023-08-30 ENCOUNTER — TRANSCRIPTION ENCOUNTER (OUTPATIENT)
Age: 23
End: 2023-08-30

## 2023-08-30 VITALS
WEIGHT: 279 LBS | HEART RATE: 54 BPM | DIASTOLIC BLOOD PRESSURE: 70 MMHG | SYSTOLIC BLOOD PRESSURE: 126 MMHG | OXYGEN SATURATION: 97 % | HEIGHT: 67 IN | RESPIRATION RATE: 16 BRPM | BODY MASS INDEX: 43.79 KG/M2 | TEMPERATURE: 98.3 F

## 2023-08-30 DIAGNOSIS — E61.1 IRON DEFICIENCY: ICD-10-CM

## 2023-08-30 PROCEDURE — 99214 OFFICE O/P EST MOD 30 MIN: CPT | Mod: 25

## 2023-08-30 NOTE — HISTORY OF PRESENT ILLNESS
[de-identified] : Ms. Amber Magdaleno is 22 year old with PIETRO here for consultation, referred by Dr. Yeni Gonzalez\par  \par  Patient with PMhx of hypothyroidism, hypercholesterolemia who had heavy menses prior to gastric bypass in 9/2020.  Menses are now normal - 4-7 days monthly with no clots.. \par  Takes b12 supplement some times. \par  \par  10/22/22 H/H 13.7/42.2 MCV 88.5 Ferritin 10\par  12/18/2021 Ferritin 21\par  \par  FHx:\par  Mother with anemia \par  \par  Social History\par  Smoking - never\par  Alcohol - none\par  Illicit drugs - none\par  Getting her MS in speech. \par  \par  She has chronic fatigue with dizziness and headaches. \par   [de-identified] : Patient is seen today for follow up and review blood work  LMP was 2 weeks ago. Lasts 5-7 days. Heavy x 1-2 days. Has been on birth control pills No more PICA Speech pathologist at [facility]

## 2023-08-30 NOTE — CONSULT LETTER
[Dear  ___] : Dear  [unfilled], [Consult Letter:] : I had the pleasure of evaluating your patient, [unfilled]. [Please see my note below.] : Please see my note below. [Consult Closing:] : Thank you very much for allowing me to participate in the care of this patient.  If you have any questions, please do not hesitate to contact me. [Sincerely,] : Sincerely, [FreeTextEntry3] : Yann Willis MD, MPH\par   of Medicine [facility] [employer] [facility] [employer] at [facility]\par  Attending Physician \par  Hematology and Medical Oncology\par  [facility]\par

## 2023-08-30 NOTE — ASSESSMENT
[FreeTextEntry1] : Iron deficiency Ferritin 10 Gastric sleeve surgery 2020 at [facility] LMP - 2 weeks ago. Was heavy prior to the weight loss surgery, but has been milder since Usually last 5 days, heavy x 1-2 days Symptoms- Tired fatigued, PICA (salt) s/p IV Injectafer 750 mg x 2 Feels much better. Less tired No PICA (salt) LMP 3 weeks ago. Lasted 5 days Blood work shows declining ferritin which is around 100 No indication for iV Iron Discussed about taking oral iron.  She is worried about constipation.  Advised to try liquid iron once a day or once every other day  Family H/o cancer Father - colon cancer M GF- colon cancer P GF - colon ca Discussed about genetic testing. She wants to defer for now Encouraged to pursue colonoscopy at least 10 years prior to the youngest age at diagnosis in her family  Patient had multiple questions which were answered to satisfaction  Labs in 4 months. CBC, CMP, ferritin  OV few days later

## 2023-09-01 RX ORDER — LEVOTHYROXINE SODIUM 0.07 MG/1
75 TABLET ORAL
Qty: 90 | Refills: 3 | Status: ACTIVE | COMMUNITY
Start: 2023-06-14 | End: 1900-01-01

## 2023-10-17 ENCOUNTER — APPOINTMENT (OUTPATIENT)
Dept: ENDOCRINOLOGY | Facility: CLINIC | Age: 23
End: 2023-10-17

## 2023-12-06 ENCOUNTER — APPOINTMENT (OUTPATIENT)
Dept: HEMATOLOGY ONCOLOGY | Facility: CLINIC | Age: 23
End: 2023-12-06

## 2023-12-06 ENCOUNTER — RESULT REVIEW (OUTPATIENT)
Age: 23
End: 2023-12-06

## 2023-12-06 VITALS
TEMPERATURE: 97.3 F | RESPIRATION RATE: 16 BRPM | DIASTOLIC BLOOD PRESSURE: 75 MMHG | BODY MASS INDEX: 43.63 KG/M2 | HEART RATE: 48 BPM | HEIGHT: 67 IN | SYSTOLIC BLOOD PRESSURE: 134 MMHG | OXYGEN SATURATION: 98 % | WEIGHT: 278 LBS

## 2023-12-13 ENCOUNTER — APPOINTMENT (OUTPATIENT)
Dept: HEMATOLOGY ONCOLOGY | Facility: CLINIC | Age: 23
End: 2023-12-13

## 2023-12-13 NOTE — CONSULT LETTER
[Dear  ___] : Dear  [unfilled], [Consult Letter:] : I had the pleasure of evaluating your patient, [unfilled]. [Please see my note below.] : Please see my note below. [Consult Closing:] : Thank you very much for allowing me to participate in the care of this patient.  If you have any questions, please do not hesitate to contact me. [Sincerely,] : Sincerely, [FreeTextEntry3] : Yann Willis MD, MPH\par   of Medicine [facility] [employer] at [facility]\par  Attending Physician \par  Hematology and Medical Oncology\par  [facility]\par

## 2023-12-13 NOTE — ASSESSMENT
[FreeTextEntry1] : Iron deficiency Ferritin 10 Gastric sleeve surgery 2020 at [facility] LMP - 2 weeks ago. Was heavy prior to the weight loss surgery, but has been milder since Usually last 5 days, heavy x 1-2 days Symptoms- Tired fatigued, PICA (salt) s/p IV Injectafer 750 mg x 2 Feels much better. Less tired No PICA (salt) LMP 3 weeks ago. Lasted 5 days Blood work shows declining ferritin which is around 100 No indication for iV Iron Discussed about taking oral iron. She is worried about constipation. Advised to try liquid iron once a day or once every other day  Family H/o cancer Father - colon cancer M GF- colon cancer P GF - colon ca Discussed about genetic testing. She wants to defer for now Encouraged to pursue colonoscopy at least 10 years prior to the youngest age at diagnosis in her family  Patient had multiple questions which were answered to satisfaction  Labs in 4 months. CBC, CMP, ferritin OV few days later.

## 2023-12-13 NOTE — HISTORY OF PRESENT ILLNESS
[de-identified] : Ms. Amber Magdaleno is 22 year old with PIETRO here for consultation, referred by Dr. Yeni Gonzalez\par  \par  Patient with PMhx of hypothyroidism, hypercholesterolemia who had heavy menses prior to gastric bypass in 9/2020.  Menses are now normal - 4-7 days monthly with no clots.. \par  Takes b12 supplement some times. \par  \par  10/22/22 H/H 13.7/42.2 MCV 88.5 Ferritin 10\par  12/18/2021 Ferritin 21\par  \par  FHx:\par  Mother with anemia \par  \par  Social History\par  Smoking - never\par  Alcohol - none\par  Illicit drugs - none\par  Getting her MS in speech. \par  \par  She has chronic fatigue with dizziness and headaches. \par   [de-identified] : Patient is seen today for follow up and review blood work  LMP was 2 weeks ago. Lasts 5-7 days. Heavy x 1-2 days. Has been on birth control pills No more PICA Speech pathologist at [facility]

## 2024-01-06 LAB
ALBUMIN SERPL ELPH-MCNC: 4.4 G/DL
ALP BLD-CCNC: 67 U/L
ALT SERPL-CCNC: 21 U/L
ANION GAP SERPL CALC-SCNC: 14 MMOL/L
AST SERPL-CCNC: 29 U/L
BILIRUB SERPL-MCNC: 0.6 MG/DL
BUN SERPL-MCNC: 20 MG/DL
CALCIUM SERPL-MCNC: 9.8 MG/DL
CHLORIDE SERPL-SCNC: 104 MMOL/L
CHOLEST SERPL-MCNC: 226 MG/DL
CO2 SERPL-SCNC: 21 MMOL/L
CREAT SERPL-MCNC: 0.94 MG/DL
EGFR: 86 ML/MIN/1.73M2
ESTIMATED AVERAGE GLUCOSE: 103 MG/DL
GLUCOSE SERPL-MCNC: 78 MG/DL
HBA1C MFR BLD HPLC: 5.2 %
HDLC SERPL-MCNC: 54 MG/DL
LDLC SERPL CALC-MCNC: 156 MG/DL
NONHDLC SERPL-MCNC: 172 MG/DL
POTASSIUM SERPL-SCNC: 4.3 MMOL/L
PROT SERPL-MCNC: 6.7 G/DL
SODIUM SERPL-SCNC: 140 MMOL/L
T4 FREE SERPL-MCNC: 1.5 NG/DL
TRIGL SERPL-MCNC: 89 MG/DL
TSH SERPL-ACNC: 1.13 UIU/ML

## 2024-01-08 ENCOUNTER — APPOINTMENT (OUTPATIENT)
Dept: ENDOCRINOLOGY | Facility: CLINIC | Age: 24
End: 2024-01-08

## 2024-05-13 ENCOUNTER — APPOINTMENT (OUTPATIENT)
Dept: INTERNAL MEDICINE | Facility: CLINIC | Age: 24
End: 2024-05-13

## 2024-07-09 ENCOUNTER — APPOINTMENT (OUTPATIENT)
Dept: ENDOCRINOLOGY | Facility: CLINIC | Age: 24
End: 2024-07-09

## 2024-08-07 ENCOUNTER — APPOINTMENT (OUTPATIENT)
Dept: INTERNAL MEDICINE | Facility: CLINIC | Age: 24
End: 2024-08-07

## 2024-10-11 ENCOUNTER — RESULT REVIEW (OUTPATIENT)
Age: 24
End: 2024-10-11

## 2024-10-11 ENCOUNTER — APPOINTMENT (OUTPATIENT)
Dept: HEMATOLOGY ONCOLOGY | Facility: CLINIC | Age: 24
End: 2024-10-11
Payer: COMMERCIAL

## 2024-10-11 VITALS
DIASTOLIC BLOOD PRESSURE: 81 MMHG | OXYGEN SATURATION: 98 % | HEIGHT: 67 IN | SYSTOLIC BLOOD PRESSURE: 120 MMHG | HEART RATE: 93 BPM | BODY MASS INDEX: 39.88 KG/M2 | WEIGHT: 254.06 LBS | TEMPERATURE: 97.2 F | RESPIRATION RATE: 16 BRPM

## 2024-10-11 DIAGNOSIS — E61.1 IRON DEFICIENCY: ICD-10-CM

## 2024-10-11 PROCEDURE — 99214 OFFICE O/P EST MOD 30 MIN: CPT

## 2024-10-11 PROCEDURE — G2211 COMPLEX E/M VISIT ADD ON: CPT | Mod: NC

## 2024-10-11 PROCEDURE — 36415 COLL VENOUS BLD VENIPUNCTURE: CPT

## 2024-10-11 RX ORDER — LISDEXAMFETAMINE DIMESYLATE 30 MG/1
30 CAPSULE ORAL
Refills: 0 | Status: ACTIVE | COMMUNITY

## 2024-10-11 RX ORDER — SERTRALINE HYDROCHLORIDE 150 MG/1
CAPSULE ORAL
Refills: 0 | Status: ACTIVE | COMMUNITY

## 2025-01-07 ENCOUNTER — APPOINTMENT (OUTPATIENT)
Dept: HEMATOLOGY ONCOLOGY | Facility: CLINIC | Age: 25
End: 2025-01-07

## 2025-01-07 ENCOUNTER — RESULT REVIEW (OUTPATIENT)
Age: 25
End: 2025-01-07

## 2025-01-07 VITALS
HEART RATE: 86 BPM | WEIGHT: 245.3 LBS | TEMPERATURE: 97.8 F | BODY MASS INDEX: 38.5 KG/M2 | HEIGHT: 67 IN | RESPIRATION RATE: 16 BRPM | OXYGEN SATURATION: 96 % | DIASTOLIC BLOOD PRESSURE: 90 MMHG | SYSTOLIC BLOOD PRESSURE: 136 MMHG

## 2025-01-14 ENCOUNTER — APPOINTMENT (OUTPATIENT)
Dept: HEMATOLOGY ONCOLOGY | Facility: CLINIC | Age: 25
End: 2025-01-14
Payer: COMMERCIAL

## 2025-01-14 ENCOUNTER — APPOINTMENT (OUTPATIENT)
Dept: INTERNAL MEDICINE | Facility: CLINIC | Age: 25
End: 2025-01-14

## 2025-01-14 VITALS
HEIGHT: 67 IN | TEMPERATURE: 97.6 F | DIASTOLIC BLOOD PRESSURE: 80 MMHG | OXYGEN SATURATION: 100 % | SYSTOLIC BLOOD PRESSURE: 129 MMHG | HEART RATE: 85 BPM | RESPIRATION RATE: 16 BRPM | WEIGHT: 246.44 LBS | BODY MASS INDEX: 38.68 KG/M2

## 2025-01-14 DIAGNOSIS — E61.1 IRON DEFICIENCY: ICD-10-CM

## 2025-01-14 PROCEDURE — 99213 OFFICE O/P EST LOW 20 MIN: CPT

## 2025-01-14 PROCEDURE — G2211 COMPLEX E/M VISIT ADD ON: CPT | Mod: NC

## 2025-04-25 ENCOUNTER — APPOINTMENT (OUTPATIENT)
Dept: HEMATOLOGY ONCOLOGY | Facility: CLINIC | Age: 25
End: 2025-04-25

## 2025-05-02 ENCOUNTER — APPOINTMENT (OUTPATIENT)
Dept: HEMATOLOGY ONCOLOGY | Facility: CLINIC | Age: 25
End: 2025-05-02

## 2025-07-08 ENCOUNTER — APPOINTMENT (OUTPATIENT)
Dept: HEMATOLOGY ONCOLOGY | Facility: CLINIC | Age: 25
End: 2025-07-08

## 2025-07-15 ENCOUNTER — APPOINTMENT (OUTPATIENT)
Dept: HEMATOLOGY ONCOLOGY | Facility: CLINIC | Age: 25
End: 2025-07-15